# Patient Record
Sex: FEMALE | Race: WHITE | NOT HISPANIC OR LATINO | Employment: UNEMPLOYED | URBAN - METROPOLITAN AREA
[De-identification: names, ages, dates, MRNs, and addresses within clinical notes are randomized per-mention and may not be internally consistent; named-entity substitution may affect disease eponyms.]

---

## 2024-04-22 ENCOUNTER — HOSPITAL ENCOUNTER (EMERGENCY)
Facility: HOSPITAL | Age: 39
Discharge: HOME | End: 2024-04-23
Attending: EMERGENCY MEDICINE
Payer: COMMERCIAL

## 2024-04-22 DIAGNOSIS — K52.9 ENTERITIS: Primary | ICD-10-CM

## 2024-04-22 DIAGNOSIS — R19.7 VOMITING AND DIARRHEA: ICD-10-CM

## 2024-04-22 DIAGNOSIS — R11.10 VOMITING AND DIARRHEA: ICD-10-CM

## 2024-04-22 DIAGNOSIS — R10.33 PERIUMBILICAL ABDOMINAL PAIN: ICD-10-CM

## 2024-04-22 LAB
ALBUMIN SERPL-MCNC: 4.8 G/DL (ref 3.5–5.2)
ALBUMIN/GLOB SERPL: 1.8 G/DL
ALP SERPL-CCNC: 58 U/L (ref 39–117)
ALT SERPL W P-5'-P-CCNC: 25 U/L (ref 1–33)
ANION GAP SERPL CALCULATED.3IONS-SCNC: 12.7 MMOL/L (ref 5–15)
AST SERPL-CCNC: 17 U/L (ref 1–32)
BASOPHILS # BLD AUTO: 0.04 10*3/MM3 (ref 0–0.2)
BASOPHILS NFR BLD AUTO: 0.3 % (ref 0–1.5)
BILIRUB SERPL-MCNC: 0.5 MG/DL (ref 0–1.2)
BILIRUB UR QL STRIP: ABNORMAL
BUN SERPL-MCNC: 14 MG/DL (ref 6–20)
BUN/CREAT SERPL: 16.1 (ref 7–25)
CALCIUM SPEC-SCNC: 9.8 MG/DL (ref 8.6–10.5)
CHLORIDE SERPL-SCNC: 103 MMOL/L (ref 98–107)
CLARITY UR: CLEAR
CO2 SERPL-SCNC: 24.3 MMOL/L (ref 22–29)
COLOR UR: YELLOW
CREAT SERPL-MCNC: 0.87 MG/DL (ref 0.57–1)
DEPRECATED RDW RBC AUTO: 41.2 FL (ref 37–54)
EGFRCR SERPLBLD CKD-EPI 2021: 88.1 ML/MIN/1.73
EOSINOPHIL # BLD AUTO: 0.12 10*3/MM3 (ref 0–0.4)
EOSINOPHIL NFR BLD AUTO: 0.8 % (ref 0.3–6.2)
ERYTHROCYTE [DISTWIDTH] IN BLOOD BY AUTOMATED COUNT: 12.2 % (ref 12.3–15.4)
GLOBULIN UR ELPH-MCNC: 2.7 GM/DL
GLUCOSE SERPL-MCNC: 125 MG/DL (ref 65–99)
GLUCOSE UR STRIP-MCNC: NEGATIVE MG/DL
HCG SERPL QL: NEGATIVE
HCT VFR BLD AUTO: 45.1 % (ref 34–46.6)
HGB BLD-MCNC: 15.3 G/DL (ref 12–15.9)
HGB UR QL STRIP.AUTO: NEGATIVE
IMM GRANULOCYTES # BLD AUTO: 0.02 10*3/MM3 (ref 0–0.05)
IMM GRANULOCYTES NFR BLD AUTO: 0.1 % (ref 0–0.5)
KETONES UR QL STRIP: NEGATIVE
LEUKOCYTE ESTERASE UR QL STRIP.AUTO: NEGATIVE
LIPASE SERPL-CCNC: 27 U/L (ref 13–60)
LYMPHOCYTES # BLD AUTO: 1.11 10*3/MM3 (ref 0.7–3.1)
LYMPHOCYTES NFR BLD AUTO: 7.7 % (ref 19.6–45.3)
MCH RBC QN AUTO: 30.8 PG (ref 26.6–33)
MCHC RBC AUTO-ENTMCNC: 33.9 G/DL (ref 31.5–35.7)
MCV RBC AUTO: 90.7 FL (ref 79–97)
MONOCYTES # BLD AUTO: 0.99 10*3/MM3 (ref 0.1–0.9)
MONOCYTES NFR BLD AUTO: 6.8 % (ref 5–12)
NEUTROPHILS NFR BLD AUTO: 12.18 10*3/MM3 (ref 1.7–7)
NEUTROPHILS NFR BLD AUTO: 84.3 % (ref 42.7–76)
NITRITE UR QL STRIP: NEGATIVE
PH UR STRIP.AUTO: 6 [PH] (ref 5–8)
PLATELET # BLD AUTO: 265 10*3/MM3 (ref 140–450)
PMV BLD AUTO: 9.5 FL (ref 6–12)
POTASSIUM SERPL-SCNC: 4.1 MMOL/L (ref 3.5–5.2)
PROT SERPL-MCNC: 7.5 G/DL (ref 6–8.5)
PROT UR QL STRIP: ABNORMAL
RBC # BLD AUTO: 4.97 10*6/MM3 (ref 3.77–5.28)
SODIUM SERPL-SCNC: 140 MMOL/L (ref 136–145)
SP GR UR STRIP: >=1.03 (ref 1–1.03)
UROBILINOGEN UR QL STRIP: ABNORMAL
WBC NRBC COR # BLD AUTO: 14.46 10*3/MM3 (ref 3.4–10.8)

## 2024-04-22 PROCEDURE — 81003 URINALYSIS AUTO W/O SCOPE: CPT | Performed by: EMERGENCY MEDICINE

## 2024-04-22 PROCEDURE — 85025 COMPLETE CBC W/AUTO DIFF WBC: CPT | Performed by: EMERGENCY MEDICINE

## 2024-04-22 PROCEDURE — 99285 EMERGENCY DEPT VISIT HI MDM: CPT

## 2024-04-22 PROCEDURE — 84703 CHORIONIC GONADOTROPIN ASSAY: CPT | Performed by: EMERGENCY MEDICINE

## 2024-04-22 PROCEDURE — 80053 COMPREHEN METABOLIC PANEL: CPT | Performed by: EMERGENCY MEDICINE

## 2024-04-22 PROCEDURE — 99284 EMERGENCY DEPT VISIT MOD MDM: CPT | Performed by: EMERGENCY MEDICINE

## 2024-04-22 PROCEDURE — 83690 ASSAY OF LIPASE: CPT | Performed by: EMERGENCY MEDICINE

## 2024-04-22 RX ORDER — ONDANSETRON 2 MG/ML
4 INJECTION INTRAMUSCULAR; INTRAVENOUS ONCE
Status: COMPLETED | OUTPATIENT
Start: 2024-04-23 | End: 2024-04-23

## 2024-04-22 RX ORDER — KETOROLAC TROMETHAMINE 30 MG/ML
30 INJECTION, SOLUTION INTRAMUSCULAR; INTRAVENOUS ONCE
Status: COMPLETED | OUTPATIENT
Start: 2024-04-23 | End: 2024-04-23

## 2024-04-22 RX ORDER — SODIUM CHLORIDE 0.9 % (FLUSH) 0.9 %
10 SYRINGE (ML) INJECTION AS NEEDED
Status: DISCONTINUED | OUTPATIENT
Start: 2024-04-22 | End: 2024-04-23 | Stop reason: HOSPADM

## 2024-04-23 ENCOUNTER — APPOINTMENT (OUTPATIENT)
Dept: CT IMAGING | Facility: HOSPITAL | Age: 39
End: 2024-04-23
Payer: COMMERCIAL

## 2024-04-23 VITALS
TEMPERATURE: 98.2 F | HEIGHT: 63 IN | WEIGHT: 190 LBS | BODY MASS INDEX: 33.66 KG/M2 | RESPIRATION RATE: 18 BRPM | OXYGEN SATURATION: 99 % | DIASTOLIC BLOOD PRESSURE: 77 MMHG | HEART RATE: 105 BPM | SYSTOLIC BLOOD PRESSURE: 126 MMHG

## 2024-04-23 LAB — HOLD SPECIMEN: NORMAL

## 2024-04-23 PROCEDURE — 25510000001 IOPAMIDOL 61 % SOLUTION: Performed by: EMERGENCY MEDICINE

## 2024-04-23 PROCEDURE — 74177 CT ABD & PELVIS W/CONTRAST: CPT

## 2024-04-23 PROCEDURE — 96375 TX/PRO/DX INJ NEW DRUG ADDON: CPT

## 2024-04-23 PROCEDURE — 96374 THER/PROPH/DIAG INJ IV PUSH: CPT

## 2024-04-23 PROCEDURE — 96361 HYDRATE IV INFUSION ADD-ON: CPT

## 2024-04-23 PROCEDURE — 25010000002 KETOROLAC TROMETHAMINE PER 15 MG: Performed by: EMERGENCY MEDICINE

## 2024-04-23 PROCEDURE — 96376 TX/PRO/DX INJ SAME DRUG ADON: CPT

## 2024-04-23 PROCEDURE — 25810000003 SODIUM CHLORIDE 0.9 % SOLUTION: Performed by: EMERGENCY MEDICINE

## 2024-04-23 PROCEDURE — 25010000002 ONDANSETRON PER 1 MG: Performed by: EMERGENCY MEDICINE

## 2024-04-23 RX ORDER — ONDANSETRON 4 MG/1
4 TABLET, ORALLY DISINTEGRATING ORAL EVERY 8 HOURS PRN
Qty: 6 TABLET | Refills: 0 | Status: SHIPPED | OUTPATIENT
Start: 2024-04-23

## 2024-04-23 RX ORDER — LOPERAMIDE HYDROCHLORIDE AND SIMETHICONE 2; 125 MG/1; MG/1
1 TABLET ORAL AS NEEDED
Qty: 4 TABLET | Refills: 0 | Status: SHIPPED | OUTPATIENT
Start: 2024-04-23

## 2024-04-23 RX ORDER — METRONIDAZOLE 500 MG/1
500 TABLET ORAL 4 TIMES DAILY
Qty: 28 TABLET | Refills: 0 | Status: SHIPPED | OUTPATIENT
Start: 2024-04-23 | End: 2024-04-30

## 2024-04-23 RX ORDER — ONDANSETRON 2 MG/ML
4 INJECTION INTRAMUSCULAR; INTRAVENOUS ONCE
Status: COMPLETED | OUTPATIENT
Start: 2024-04-23 | End: 2024-04-23

## 2024-04-23 RX ORDER — CIPROFLOXACIN 750 MG/1
750 TABLET, FILM COATED ORAL 2 TIMES DAILY
Qty: 14 TABLET | Refills: 0 | Status: SHIPPED | OUTPATIENT
Start: 2024-04-23 | End: 2024-04-30

## 2024-04-23 RX ADMIN — IOPAMIDOL 85 ML: 612 INJECTION, SOLUTION INTRAVENOUS at 01:23

## 2024-04-23 RX ADMIN — KETOROLAC TROMETHAMINE 30 MG: 30 INJECTION, SOLUTION INTRAMUSCULAR at 00:09

## 2024-04-23 RX ADMIN — ONDANSETRON 4 MG: 2 INJECTION INTRAMUSCULAR; INTRAVENOUS at 02:15

## 2024-04-23 RX ADMIN — ONDANSETRON 4 MG: 2 INJECTION INTRAMUSCULAR; INTRAVENOUS at 00:09

## 2024-04-23 RX ADMIN — SODIUM CHLORIDE 1000 ML: 9 INJECTION, SOLUTION INTRAVENOUS at 00:09

## 2024-04-23 NOTE — FSED PROVIDER NOTE
"Subjective   History of Present Illness  37yo female presents ED c/o 1d hx acute onset periumbilical abdominal pain characterized as \"sharp/cramping\"/nonradiating/associated nausea, vomiting, diarrhea, chills/neg exac or relieve factors.  ROS neg fever/cough/soa/chest pain/dysuria/melena/hematochoezia/hematemesis.  Pt denies sick contact/recent antibiotic use/travel.    History provided by:  Patient  Abdominal Pain  Pain location:  Periumbilical  Associated symptoms: chills, diarrhea, nausea and vomiting        Review of Systems   Constitutional:  Positive for chills.   HENT: Negative.     Eyes: Negative.    Respiratory: Negative.     Cardiovascular: Negative.    Gastrointestinal:  Positive for abdominal pain, diarrhea, nausea and vomiting. Negative for blood in stool.   Genitourinary: Negative.    Allergic/Immunologic: Negative for immunocompromised state.   All other systems reviewed and are negative.      History reviewed. No pertinent past medical history.    No Known Allergies    History reviewed. No pertinent surgical history.    History reviewed. No pertinent family history.    Social History     Socioeconomic History    Marital status:            Objective   Physical Exam  Vitals and nursing note reviewed.   Constitutional:       Appearance: Normal appearance.   HENT:      Head: Normocephalic and atraumatic.      Right Ear: External ear normal.      Left Ear: External ear normal.      Nose: Nose normal. No rhinorrhea.      Mouth/Throat:      Mouth: Mucous membranes are moist.      Pharynx: Oropharynx is clear.   Eyes:      Pupils: Pupils are equal, round, and reactive to light.   Cardiovascular:      Rate and Rhythm: Normal rate and regular rhythm.      Pulses: Normal pulses.      Heart sounds: Normal heart sounds. No murmur heard.     No friction rub. No gallop.   Pulmonary:      Effort: Pulmonary effort is normal.      Breath sounds: Normal breath sounds. No wheezing, rhonchi or rales.   Abdominal: "      General: Abdomen is flat.      Palpations: Abdomen is soft.      Tenderness:  in the epigastric area and periumbilical area There is no guarding or rebound. Negative signs include Domingo's sign, Rovsing's sign and McBurney's sign.      Hernia: There is no hernia in the umbilical area or ventral area.       Musculoskeletal:         General: Normal range of motion.      Cervical back: Normal range of motion and neck supple. No rigidity.      Right lower leg: No edema.      Left lower leg: No edema.   Lymphadenopathy:      Cervical: No cervical adenopathy.   Skin:     General: Skin is warm and dry.   Neurological:      General: No focal deficit present.      Mental Status: She is alert and oriented to person, place, and time.      GCS: GCS eye subscore is 4. GCS verbal subscore is 5. GCS motor subscore is 6.         Procedures           ED Course      Labs Reviewed   COMPREHENSIVE METABOLIC PANEL - Abnormal; Notable for the following components:       Result Value    Glucose 125 (*)     All other components within normal limits    Narrative:     GFR Normal >60  Chronic Kidney Disease <60  Kidney Failure <15     CBC WITH AUTO DIFFERENTIAL - Abnormal; Notable for the following components:    WBC 14.46 (*)     RDW 12.2 (*)     Neutrophil % 84.3 (*)     Lymphocyte % 7.7 (*)     Neutrophils, Absolute 12.18 (*)     Monocytes, Absolute 0.99 (*)     All other components within normal limits   URINALYSIS W/ CULTURE IF INDICATED - Abnormal; Notable for the following components:    Bilirubin, UA Small (1+) (*)     Protein,  mg/dL (2+) (*)     All other components within normal limits    Narrative:     In absence of clinical symptoms, the presence of pyuria, bacteria, and/or nitrites on the urinalysis result does not correlate with infection.   LIPASE - Normal   HCG, SERUM, QUALITATIVE - Normal   URINALYSIS, MICROSCOPIC ONLY   RAINBOW URINE CULTURE TUBE   CBC AND DIFFERENTIAL    Narrative:     The following orders were  created for panel order CBC & Differential.  Procedure                               Abnormality         Status                     ---------                               -----------         ------                     CBC Auto Differential[[id number]]        Abnormal            Final result                 Please view results for these tests on the individual orders.     CT Abdomen Pelvis With Contrast    Result Date: 4/23/2024  Narrative: Patient: DAWN ALDRICH  Time Out: 01:57 Exam(s): CT ABDOMEN + PELVIS With Contrast EXAM:   CT Abdomen and Pelvis With Intravenous Contrast CLINICAL HISTORY:    Reason for exam: abd pain. TECHNIQUE:   Axial computed tomography images of the abdomen and pelvis with intravenous contrast.  CTDI is 18.45 mGy and DLP is 878.4 mGy-cm.  This CT exam was performed according to the principle of ALARA (As Low As Reasonably Achievable) by using one or more of the following dose reduction techniques: automated exposure control, adjustment of the mA and or kV according to patient size, and or use of iterative reconstruction technique. COMPARISON:    None. FINDINGS:   Lung bases: Clear.   Liver:  Unremarkable.   Gallbladder and bile ducts: Normal gallbladder.  No ductal dilation.   Pancreas: No ductal dilation.   Spleen: Unremarkable.   Adrenals: Unremarkable.   Kidneys and ureters: No pyelonephritis or hydronephrosis.   Stomach and bowel: Mild fluid fluid level right colon and small bowel, nonspecific, probable gastroenteritis or ileus.  No obstruction.    Appendix:  Normal.   Intraperitoneal space: No free air or fluid.   Bones joints: No acute fracture.    Soft tissues: Unremarkable.   Vasculature: No aortic aneurysm.   Lymph nodes: No enlarged lymph nodes.   Bladder: No stones.   Reproductive: Normal for patient's age and this technique. IMPRESSION:     1.  Question mild gastroenteritis or ileus. 2.  Normal gallbladder, kidneys and appendix.     Impression: Electronically signed by Negar  WHITNEY Bray on 04-23-24 at 0157                                        Medical Decision Making  Labs/radiographic findings reviewed.  CBC: remarkable wbc 14k/left shift.  CMP/lipase: unremarkable.  Hcg: negative.  UA: significant proteinuria.  CT abd/pelvis: mild fluid noted small bowel, right colon/no bowel obstruction/normal appendix/normal gallbladder.  No evidence clinical peritonitis/obstruction/hemorrhage/perforation/dysentery.  Plan cipro 750mg po bid x7d/flagyl 500mg po qid x7d/prn zofran 4mg odt/prn loperamide-simethicone diarrhea.  Pmd followup. Strict return precautions provided.    Problems Addressed:  Enteritis: complicated acute illness or injury  Periumbilical abdominal pain: complicated acute illness or injury  Vomiting and diarrhea: complicated acute illness or injury    Amount and/or Complexity of Data Reviewed  Labs: ordered.  Radiology: ordered.    Risk  OTC drugs.  Prescription drug management.        Final diagnoses:   Enteritis   Vomiting and diarrhea   Periumbilical abdominal pain       ED Disposition  ED Disposition       ED Disposition   Discharge    Condition   Good    Comment   --               Ayana Boothe, KAITLYNN  [address] [address]  [phone]    In 1 day           Medication List        New Prescriptions      ciprofloxacin 750 MG tablet  Commonly known as: CIPRO  Take 1 tablet by mouth 2 (Two) Times a Day for 7 days.     Loperamide-Simethicone 2-125 MG tablet  Take 1 tablet by mouth As Needed (diarrhea). Take 2 tabs initially then take 1 tab by mouth after each diarrheal stool. Max 4 tabs/day     metroNIDAZOLE 500 MG tablet  Commonly known as: FLAGYL  Take 1 tablet by mouth 4 (Four) Times a Day for 7 days.     ondansetron ODT 4 MG disintegrating tablet  Commonly known as: ZOFRAN-ODT  Place 1 tablet on the tongue Every 8 (Eight) Hours As Needed for Nausea or Vomiting.               Where to Get Your Medications        These medications were sent to  [id number] - [place], [place] - [address] AT [facility] [address] - [phone] PH - [phone]   [address] [address]      Phone: [phone]   ciprofloxacin 750 MG tablet  Loperamide-Simethicone 2-125 MG tablet  metroNIDAZOLE 500 MG tablet  ondansetron ODT 4 MG disintegrating tablet

## 2024-09-21 ENCOUNTER — HOSPITAL ENCOUNTER (EMERGENCY)
Facility: HOSPITAL | Age: 39
Discharge: HOME | End: 2024-09-22
Attending: EMERGENCY MEDICINE
Payer: COMMERCIAL

## 2024-09-21 VITALS
DIASTOLIC BLOOD PRESSURE: 87 MMHG | HEART RATE: 73 BPM | HEIGHT: 63 IN | SYSTOLIC BLOOD PRESSURE: 137 MMHG | OXYGEN SATURATION: 99 % | WEIGHT: 190 LBS | RESPIRATION RATE: 15 BRPM | BODY MASS INDEX: 33.66 KG/M2 | TEMPERATURE: 98.6 F

## 2024-09-21 DIAGNOSIS — Z51.89 VISIT FOR WOUND CHECK: Primary | ICD-10-CM

## 2024-09-21 PROCEDURE — 99283 EMERGENCY DEPT VISIT LOW MDM: CPT

## 2024-09-22 PROCEDURE — 99282 EMERGENCY DEPT VISIT SF MDM: CPT | Performed by: EMERGENCY MEDICINE

## 2024-09-22 NOTE — FSED PROVIDER NOTE
EMERGENCY DEPARTMENT ENCOUNTER    Room Number:  09/09  Date seen:  9/22/2024  Time seen: 01:08 EDT  PCP: Ayana Boothe APRN  Historian:     Discussed/obtained information from independent historians:     HPI:  Patient is postop day #3 status post bilateral salpingectomy.  She presents for a wound check.  She is concerned her trocar sites are becoming infected.  They have become slightly reddened.  No systemic fever or chills.  No purulent discharge      External (non-ED) record review:        Chronic or social conditions impacting care:    ALLERGIES  Patient has no known allergies.    PAST MEDICAL HISTORY  Active Ambulatory Problems     Diagnosis Date Noted    No Active Ambulatory Problems     Resolved Ambulatory Problems     Diagnosis Date Noted    No Resolved Ambulatory Problems     No Additional Past Medical History       PAST SURGICAL HISTORY  No past surgical history on file.    FAMILY HISTORY  No family history on file.    SOCIAL HISTORY  Social History     Socioeconomic History    Marital status:        REVIEW OF SYSTEMS  Review of Systems    All systems reviewed and negative except for those discussed in HPI.       PHYSICAL EXAM    I have reviewed the triage vital signs and nursing notes.  Vitals:    09/21/24 2238   BP: 137/87   Pulse: 73   Resp: 15   Temp: 98.6 °F (37 °C)   SpO2: 99%     Physical Exam  General: Awake alert no acute distress    Skin and abdomen: The patient's trocar sites at the umbilicus, left lower quadrant, right mid and right lower quadrant are healing well.  There is very minimal redness and excoriation that appears to be most consistent with inflammatory or allergic reaction.  There is no evidence of purulent discharge at any of the wounds.  I do not detect any induration to palpation at any of the wounds.  No fluctuance noted      LAB RESULTS  No results found for this or any previous visit (from the past 24 hour(s)).    Ordered the above labs and independently  interpreted results.  My findings will be discussed in the ED course or medical decision making section below      PROCEDURES:  Procedures      RADIOLOGY RESULTS  No Radiology Exams Resulted Within Past 24 Hours     Ordered the above noted radiological studies.  Independently interpreted by me.  My findings will be discussed in the medical decision section below.     PROGRESS, DATA ANALYSIS, CONSULTS AND MEDICAL DECISION MAKING    Please note that this section constitutes my independent interpretation of clinical data including lab results, radiology, EKG's.  This constitutes my independent professional opinion regarding differential diagnosis and management of this patient.  It may include any factors such as history from outside sources, review of external records, social determinants of health, management of medications, response to those treatments, and discussions with other providers.       Orders placed during this visit:  Orders Placed This Encounter   Procedures    Vasoline gauze and sterile dressing please.  Misc Nursing Order (Specify)       Medications - No data to display         Medical Decision Making  Problems Addressed:  Visit for wound check: acute illness or injury            DIAGNOSIS  Final diagnoses:   Visit for wound check          Medication List      No changes were made to your prescriptions during this visit.         FOLLOW-UP  Ayana Boothe, APRN  [address] [address]  [phone]    In 1 week          Latest Documented Vital Signs:  As of 01:09 EDT  BP- 137/87 HR- 73 Temp- 98.6 °F (37 °C) (Oral) O2 sat- 99%    Appropriate PPE utilized throughout this patient encounter to include mask, if indicated, per current protocol. Hand hygiene was performed before donning PPE and after removal when leaving the room.    Please note that portions of this were completed with a voice recognition program.     Note Disclaimer: At [facility], we believe that sharing  information builds trust and better relationships. You are receiving this note because you are receiving care at [facility] or recently visited. It is possible you will see health information before a provider has talked with you about it. This kind of information can be easy to misunderstand. To help you fully understand what it means for your health, we urge you to discuss this note with your provider.

## 2024-09-22 NOTE — ED NOTES
Pt's surgical sites cleaned with surgical scrub and patted dry. Vaseline gauze and antibiotic ointment applied to sites and covered with nonadherent dressing, secured in place with paper tape. Wound care teaching given via demonstration. All questions answered. Pt tolerated well.

## 2024-09-22 NOTE — DISCHARGE INSTRUCTIONS
Today I do not see any evidence of a wound infection.  You are having some inflammatory changes likely secondary to some allergens encountered during your surgical procedures.    Please keep in touch with your surgeon.  Things to watch for include fever, increasing redness, increasing pain.    Please read all of the instructions in this handout.  If you receive prescriptions please fill them and take them as directed.  Please call your primary care physician for follow-up appointment in the next 5 to 7 days.  If you do not have a physician you may call the [facility] referral line at [phone].    You may return to the emergency department at any time for any concerns such as worsening symptoms.  If you received a work or school note it will be printed at the back of this packet.

## 2024-09-22 NOTE — ED NOTES
Pt's wounds visualized and actually appear to be healing as expected. Minimal serosanguinous drainage noted, not hot to the touch, no appreciable redness except at the actual incision site. Surrounding area has no firmness, heat or redness. Very small bumps noted to pt's abd which appear to be mild and noninflammed, possibly from surgical prep as it only appears to pt's abd.

## 2025-07-30 ENCOUNTER — OFFICE VISIT (OUTPATIENT)
Dept: SURGERY | Facility: CLINIC | Age: 40
End: 2025-07-30
Payer: COMMERCIAL

## 2025-07-30 VITALS
HEIGHT: 63 IN | HEART RATE: 97 BPM | BODY MASS INDEX: 32.59 KG/M2 | SYSTOLIC BLOOD PRESSURE: 130 MMHG | DIASTOLIC BLOOD PRESSURE: 84 MMHG | OXYGEN SATURATION: 100 % | WEIGHT: 183.9 LBS

## 2025-07-30 DIAGNOSIS — K62.5 RECTAL BLEEDING: Primary | ICD-10-CM

## 2025-07-30 DIAGNOSIS — K62.89 RECTAL PAIN: ICD-10-CM

## 2025-07-30 DIAGNOSIS — K64.4 EXTERNAL HEMORRHOIDS WITH COMPLICATION: ICD-10-CM

## 2025-07-30 DIAGNOSIS — K64.8 INTERNAL HEMORRHOIDS WITH COMPLICATION: ICD-10-CM

## 2025-07-30 RX ORDER — SODIUM CHLORIDE, SODIUM LACTATE, POTASSIUM CHLORIDE, CALCIUM CHLORIDE 600; 310; 30; 20 MG/100ML; MG/100ML; MG/100ML; MG/100ML
30 INJECTION, SOLUTION INTRAVENOUS CONTINUOUS
OUTPATIENT
Start: 2025-07-30 | End: 2025-07-31

## 2025-07-30 RX ORDER — CHOLECALCIFEROL (VITAMIN D3) 25 MCG
1000 TABLET ORAL DAILY
COMMUNITY

## 2025-07-30 NOTE — PROGRESS NOTES
History of Present Illness  The patient is a 39-year-old female presenting as a new patient for evaluation of anal skin tags.    She sought medical attention from her primary care physician due to intermittent rectal discomfort, which was not severe. Her PCP diagnosed her with hemorrhoids, a condition she developed following a difficult childbirth. An internal examination was performed, and she was referred to a specialist. She experiences mild pain and pressure in the rectal area, occurring less than once a week. She also noticed blood on the toilet paper, but not in her stool, which resolved after one episode. Her bowel movements are regular, occurring once daily, without any straining or pushing. She describes her stools as type 4 on the Ellis Stool Scale. She has never undergone a colonoscopy. She has been prescribed a cream for her hemorrhoids but was unaware that it could be used internally. Prior to eliminating gluten from her diet, she had multiple bowel movements per day, but they were normal. After eliminating gluten, she experienced diarrhea when she consumed it again. She also mentions occasional constipation due to stress. She has been following an elimination diet and has eliminated gluten from her diet, which causes diarrhea if consumed.    She expresses concern about a possible HPV infection in her rectum, even though she has received the HPV vaccine.    FAMILY HISTORY  Her father has had a polyp recently but no history of colon cancer.    IMMUNIZATIONS  She has had the HPV vaccine.       History reviewed. No pertinent past medical history.    History reviewed. No pertinent surgical history.    Social History:   reports that she quit smoking about 14 years ago. Her smoking use included cigarettes. She started smoking about 19 years ago. She has a 1.3 pack-year smoking history. She has been exposed to tobacco smoke. She has never used smokeless tobacco. She reports current alcohol use. She reports  that she does not use drugs.      Marriage status:     History reviewed. No pertinent family history.      Current Outpatient Medications:     cholecalciferol (Vitamin D, Cholecalciferol,) 25 MCG (1000 UT) tablet, Take 1 tablet by mouth Daily., Disp: , Rfl:     Hydrocortisone 2.5 % kit, APPLY RECTALLY TO THE AFFECTED AREA TWICE DAILY AS NEEDED FOR HEMORRHOIDS, Disp: , Rfl:     Allergy  Patient has no known allergies.    Vitals:    07/30/25 1417   BP: 130/84   Pulse: 97   SpO2: 100%   -  Body mass index is 32.58 kg/m².    Physical Exam  No acute distress  Chaperone present  Perianal exam: external hemorrhoids slightly enlarged, tiny papilla just left of midline in posterior position. ZAINA: no masses. Anoscopy performed: Grade 1-2 x 3 internal hemorrhoids     Assessment & Plan  1. Hemorrhoids.  The presence of hemorrhoids is noted. The patient reports mild pain and occasional bleeding on toilet paper. Examination revealed slightly enlarged hemorrhoids with some inflammation. A prescription for a hemorrhoid cream has already been provided, to be applied both externally and internally. Instructions on how to use the cream were given. A colonoscopy will be scheduled to rule out any underlying conditions contributing to the hemorrhoids. A daily fiber supplement is recommended to help regulate bowel movements and reduce pressure on the hemorrhoids. If the fiber supplement exacerbates symptoms, it should be discontinued. If tolerated, the dosage can be gradually increased.    3. Anxiety.  The patient reports significant anxiety related to her symptoms and previous interactions with healthcare providers. Reassurance was provided that no concerning findings were noted during the examination. The patient was informed that anxiety can manifest through gastrointestinal symptoms such as diarrhea, cramps, or pain.    Follow-up  The patient will follow up in 4 to 6 weeks.       Patient or patient representative verbalized  consent for the use of Ambient Listening during the visit with  Zeenat Sarmiento PA-C for chart documentation. 7/30/2025  15:00 EDT    Zeenat Sarmiento PA-C  Physician Assistant  Colorectal Surgery